# Patient Record
Sex: MALE | Race: WHITE | NOT HISPANIC OR LATINO | Employment: FULL TIME | URBAN - METROPOLITAN AREA
[De-identification: names, ages, dates, MRNs, and addresses within clinical notes are randomized per-mention and may not be internally consistent; named-entity substitution may affect disease eponyms.]

---

## 2019-03-26 ENCOUNTER — OFFICE VISIT (OUTPATIENT)
Dept: SPORTS MEDICINE | Facility: CLINIC | Age: 29
End: 2019-03-26

## 2019-03-26 VITALS
WEIGHT: 205 LBS | HEIGHT: 68 IN | DIASTOLIC BLOOD PRESSURE: 84 MMHG | SYSTOLIC BLOOD PRESSURE: 124 MMHG | BODY MASS INDEX: 31.07 KG/M2

## 2019-03-26 DIAGNOSIS — M25.512 CHRONIC LEFT SHOULDER PAIN: Primary | ICD-10-CM

## 2019-03-26 DIAGNOSIS — G89.29 CHRONIC LEFT SHOULDER PAIN: Primary | ICD-10-CM

## 2019-03-26 DIAGNOSIS — M19.019 AC JOINT ARTHROPATHY: ICD-10-CM

## 2019-03-26 PROCEDURE — 99204 OFFICE O/P NEW MOD 45 MIN: CPT | Performed by: FAMILY MEDICINE

## 2019-03-26 PROCEDURE — 73030 X-RAY EXAM OF SHOULDER: CPT | Performed by: FAMILY MEDICINE

## 2019-03-26 NOTE — PROGRESS NOTES
"Jose Luis is a 29 y.o. year old male    Chief Complaint   Patient presents with   • Left Shoulder - Pain     Pain, Limited mobility, would like to play sports.   No prev xray       History of Present Illness  29 year old male with 6 month history of intermittent left shoulder pain.  Patient states that he has started weight lifting over the past year & pain occurs after workouts with specific exercises, notably bench press & overhead press.  Pain is dull in nature, at the top of the left shoulder, & does not radiate.  It typically lasts 2-3 days.  When pain is present no specific movements aggravate the pain.  Rest improves pain.  He has not tried any other remedies.  He denies any bruising, specific injury to the left shoulder, or mechanical symptoms such as clicking or \"giving out\".   He is worried about injuring the shoulder permanently if he does activity while pain is present.     I have reviewed the patient's medical, family, and social history in detail and updated the computerized patient record.    Review of Systems  Constitutional: Negative for fever.   Musculoskeletal:        Per HPI   Skin: Negative for rash.   Neurological: Negative for weakness and numbness.   Psychiatric/Behavioral: Negative for sleep disturbance.   All other systems reviewed and are negative.    /84   Ht 172.7 cm (68\")   Wt 93 kg (205 lb)   BMI 31.17 kg/m²      Physical Exam    Vital signs reviewed.   General: No acute distress.  Eyes: conjunctiva clear; pupils equally round and reactive  ENT: external ears and nose atraumatic; oropharynx clear  CV: no peripheral edema, 2+ distal pulses  Resp: normal respiratory effort, no use of accessory muscles  Skin: no rashes or wounds; normal turgor  Psych: mood and affect appropriate; recent and remote memory intact  Neuro: sensation to light touch intact    MSK Exam:  No swelling, ecchymosis, or joint effusion noted.  Mild TTP of the left AC joint.  Full ROM in shoulders bilaterally " w/out crepitus.  Negative scarf sign. No weakness noted of either rotator cuff.  Negative Ulloa & Neer's sign.  Negative speed test & negative empty can.      X-Ray Left Shoulder  Indication: Chronic Pain  Views: Internal & External rotation  Findings: No fracture or bony lesions noted.  No arthritic changes.  No subluxation.  Normal joint space & soft tissues.      Diagnoses and all orders for this visit:    Chronic left shoulder pain  -     XR Shoulder 2+ View Left  -     Diclofenac Sodium (PENNSAID) 2 % solution; Place 2 Act on the skin as directed by provider 2 (Two) Times a Day.    AC joint arthropathy  -     Diclofenac Sodium (PENNSAID) 2 % solution; Place 2 Act on the skin as directed by provider 2 (Two) Times a Day.      Discussed with patient that x-ray is grossly normal & given physical exam findings pain is unlikely due to rotator cuff pathology.  He has mild tenderness at the AC joint and is amenable to trying topical medication Pennsaid of which he was given 2 sample boxes today in clinic.  Advised patient to workout with lower weight & higher repetition going forward until he is able to tolerate normal workouts. Patient will also receive handout of shoulder exercises to increase rotator cuff strength.  He will return to clinic if symptoms do not improve with current treatment plan in a reasonable period of time or significantly worsen.      I have seen the patient.  I have reviewed the notes, assessments, and/or procedures performed by Dr. Powers, I concur with her/his documentation and assessment and plan for Jose Luis Reaves.      MSK exam:  R shoulder: no tenderness or warmth; full ROM; negative Murray's; negative empty can; negative liftoff; - Ulloa; - scarf  L shoulder: no tenderness or warmth; full ROM; negative Murray's; negative empty can; negative liftoff; - Ulloa; - scarf    Left Shoulder X-Ray  Indication: Pain  Views: AP Internal and External Rotation    Findings:  No fracture  No bony  lesion  Normal soft tissues  Normal joint spaces    No prior studies were available for comparison.      This document has been electronically signed by DENG Aguila Jr., DO on March 26, 2019 2:43 PM           EMR Dragon/Transcription disclaimer:    Much of this encounter note is an electronic transcription/translation of spoken language to printed text.  The electronic translation of spoken language may permit erroneous, or at times, nonsensical words or phrases to be inadvertently transcribed.  Although I have reviewed the note for such errors some may still exist.

## 2019-04-22 DIAGNOSIS — G89.29 CHRONIC LEFT SHOULDER PAIN: ICD-10-CM

## 2019-04-22 DIAGNOSIS — M25.512 CHRONIC LEFT SHOULDER PAIN: ICD-10-CM

## 2019-04-22 DIAGNOSIS — M19.019 AC JOINT ARTHROPATHY: ICD-10-CM

## 2019-04-23 DIAGNOSIS — M25.512 CHRONIC LEFT SHOULDER PAIN: ICD-10-CM

## 2019-04-23 DIAGNOSIS — G89.29 CHRONIC LEFT SHOULDER PAIN: ICD-10-CM

## 2019-04-23 DIAGNOSIS — M19.019 AC JOINT ARTHROPATHY: ICD-10-CM

## 2019-04-24 DIAGNOSIS — M25.512 CHRONIC LEFT SHOULDER PAIN: ICD-10-CM

## 2019-04-24 DIAGNOSIS — M19.019 AC JOINT ARTHROPATHY: ICD-10-CM

## 2019-04-24 DIAGNOSIS — G89.29 CHRONIC LEFT SHOULDER PAIN: ICD-10-CM
